# Patient Record
Sex: FEMALE | Race: WHITE | NOT HISPANIC OR LATINO
[De-identification: names, ages, dates, MRNs, and addresses within clinical notes are randomized per-mention and may not be internally consistent; named-entity substitution may affect disease eponyms.]

---

## 2020-10-27 DIAGNOSIS — Z86.79 PERSONAL HISTORY OF OTHER DISEASES OF THE CIRCULATORY SYSTEM: ICD-10-CM

## 2020-10-27 DIAGNOSIS — Z86.39 PERSONAL HISTORY OF OTHER ENDOCRINE, NUTRITIONAL AND METABOLIC DISEASE: ICD-10-CM

## 2020-10-27 DIAGNOSIS — Z87.19 PERSONAL HISTORY OF OTHER DISEASES OF THE DIGESTIVE SYSTEM: ICD-10-CM

## 2020-10-27 DIAGNOSIS — Z87.09 PERSONAL HISTORY OF OTHER DISEASES OF THE RESPIRATORY SYSTEM: ICD-10-CM

## 2020-10-27 DIAGNOSIS — Z87.39 PERSONAL HISTORY OF OTHER DISEASES OF THE MUSCULOSKELETAL SYSTEM AND CONNECTIVE TISSUE: ICD-10-CM

## 2020-10-29 ENCOUNTER — APPOINTMENT (OUTPATIENT)
Dept: SURGERY | Facility: CLINIC | Age: 65
End: 2020-10-29
Payer: MEDICARE

## 2020-10-29 VITALS
DIASTOLIC BLOOD PRESSURE: 69 MMHG | SYSTOLIC BLOOD PRESSURE: 117 MMHG | HEIGHT: 61 IN | WEIGHT: 132 LBS | HEART RATE: 68 BPM | BODY MASS INDEX: 24.92 KG/M2 | OXYGEN SATURATION: 98 %

## 2020-10-29 DIAGNOSIS — Z86.39 PERSONAL HISTORY OF OTHER ENDOCRINE, NUTRITIONAL AND METABOLIC DISEASE: ICD-10-CM

## 2020-10-29 PROCEDURE — 99072 ADDL SUPL MATRL&STAF TM PHE: CPT

## 2020-10-29 PROCEDURE — 99204 OFFICE O/P NEW MOD 45 MIN: CPT

## 2020-10-29 NOTE — REVIEW OF SYSTEMS
[Hearing Loss] : hearing loss [As Noted in HPI] : as noted in HPI [Negative] : Heme/Lymph [de-identified] : dysphagia and sore throat

## 2020-10-29 NOTE — REASON FOR VISIT
[Initial Consultation] : an initial consultation for [FreeTextEntry2] : hyperparathyroidism in the setting of bilateral thyroid nodules and new onset of mild renal insufficiency

## 2020-10-29 NOTE — PHYSICAL EXAM
[Midline] : located in midline position [Normal] : orientation to person, place, and time: normal [de-identified] : Extremities: JUSTICE x 4.   Skin: No obvious skin lesions.   Voice: clear

## 2020-11-18 ENCOUNTER — NON-APPOINTMENT (OUTPATIENT)
Age: 65
End: 2020-11-18

## 2020-11-18 LAB
CAU: 8 MG/DL
CREAT 24H UR-MCNC: 0.7 G/24 H
CREAT ?TM UR-MCNC: 58 MG/DL
PROT ?TM UR-MCNC: 24 HR
SPECIMEN VOL 24H UR: 1200 ML
SPECIMEN VOL 24H UR: 96 MG/24 H

## 2020-11-24 ENCOUNTER — OUTPATIENT (OUTPATIENT)
Dept: OUTPATIENT SERVICES | Facility: HOSPITAL | Age: 65
LOS: 1 days | End: 2020-11-24

## 2020-11-25 ENCOUNTER — APPOINTMENT (OUTPATIENT)
Dept: NUCLEAR MEDICINE | Facility: CLINIC | Age: 65
End: 2020-11-25
Payer: MEDICARE

## 2020-11-25 DIAGNOSIS — E21.3 HYPERPARATHYROIDISM, UNSPECIFIED: ICD-10-CM

## 2020-11-25 PROCEDURE — 78072 PARATHYRD PLANAR W/SPECT&CT: CPT | Mod: 26

## 2020-11-30 ENCOUNTER — APPOINTMENT (OUTPATIENT)
Dept: SURGERY | Facility: CLINIC | Age: 65
End: 2020-11-30
Payer: MEDICARE

## 2020-11-30 PROCEDURE — 99442: CPT

## 2021-01-21 ENCOUNTER — TRANSCRIPTION ENCOUNTER (OUTPATIENT)
Age: 66
End: 2021-01-21

## 2021-03-20 ENCOUNTER — TRANSCRIPTION ENCOUNTER (OUTPATIENT)
Age: 66
End: 2021-03-20

## 2021-04-01 ENCOUNTER — OUTPATIENT (OUTPATIENT)
Dept: OUTPATIENT SERVICES | Facility: HOSPITAL | Age: 66
LOS: 1 days | End: 2021-04-01
Payer: MEDICARE

## 2021-04-01 VITALS
HEIGHT: 61.5 IN | SYSTOLIC BLOOD PRESSURE: 110 MMHG | HEART RATE: 78 BPM | TEMPERATURE: 98 F | WEIGHT: 139.99 LBS | RESPIRATION RATE: 18 BRPM | OXYGEN SATURATION: 99 % | DIASTOLIC BLOOD PRESSURE: 60 MMHG

## 2021-04-01 DIAGNOSIS — Z01.812 ENCOUNTER FOR PREPROCEDURAL LABORATORY EXAMINATION: ICD-10-CM

## 2021-04-01 DIAGNOSIS — Z87.2 PERSONAL HISTORY OF DISEASES OF THE SKIN AND SUBCUTANEOUS TISSUE: Chronic | ICD-10-CM

## 2021-04-01 DIAGNOSIS — E03.9 HYPOTHYROIDISM, UNSPECIFIED: ICD-10-CM

## 2021-04-01 DIAGNOSIS — J45.909 UNSPECIFIED ASTHMA, UNCOMPLICATED: ICD-10-CM

## 2021-04-01 DIAGNOSIS — E04.2 NONTOXIC MULTINODULAR GOITER: ICD-10-CM

## 2021-04-01 DIAGNOSIS — N81.10 CYSTOCELE, UNSPECIFIED: Chronic | ICD-10-CM

## 2021-04-01 DIAGNOSIS — Z91.013 ALLERGY TO SEAFOOD: ICD-10-CM

## 2021-04-01 DIAGNOSIS — Z95.1 PRESENCE OF AORTOCORONARY BYPASS GRAFT: Chronic | ICD-10-CM

## 2021-04-01 DIAGNOSIS — I25.10 ATHEROSCLEROTIC HEART DISEASE OF NATIVE CORONARY ARTERY WITHOUT ANGINA PECTORIS: ICD-10-CM

## 2021-04-01 DIAGNOSIS — Z90.49 ACQUIRED ABSENCE OF OTHER SPECIFIED PARTS OF DIGESTIVE TRACT: Chronic | ICD-10-CM

## 2021-04-01 LAB
ANION GAP SERPL CALC-SCNC: 7 MMOL/L — SIGNIFICANT CHANGE UP (ref 7–14)
BUN SERPL-MCNC: 15 MG/DL — SIGNIFICANT CHANGE UP (ref 7–23)
CALCIUM SERPL-MCNC: 9.9 MG/DL — SIGNIFICANT CHANGE UP (ref 8.4–10.5)
CHLORIDE SERPL-SCNC: 105 MMOL/L — SIGNIFICANT CHANGE UP (ref 98–107)
CO2 SERPL-SCNC: 28 MMOL/L — SIGNIFICANT CHANGE UP (ref 22–31)
CREAT SERPL-MCNC: 0.91 MG/DL — SIGNIFICANT CHANGE UP (ref 0.5–1.3)
GLUCOSE SERPL-MCNC: 84 MG/DL — SIGNIFICANT CHANGE UP (ref 70–99)
HCT VFR BLD CALC: 44 % — SIGNIFICANT CHANGE UP (ref 34.5–45)
HGB BLD-MCNC: 14.1 G/DL — SIGNIFICANT CHANGE UP (ref 11.5–15.5)
MCHC RBC-ENTMCNC: 29.2 PG — SIGNIFICANT CHANGE UP (ref 27–34)
MCHC RBC-ENTMCNC: 32 GM/DL — SIGNIFICANT CHANGE UP (ref 32–36)
MCV RBC AUTO: 91.1 FL — SIGNIFICANT CHANGE UP (ref 80–100)
NRBC # BLD: 0 /100 WBCS — SIGNIFICANT CHANGE UP
NRBC # FLD: 0 K/UL — SIGNIFICANT CHANGE UP
PLATELET # BLD AUTO: 285 K/UL — SIGNIFICANT CHANGE UP (ref 150–400)
POTASSIUM SERPL-MCNC: 4.5 MMOL/L — SIGNIFICANT CHANGE UP (ref 3.5–5.3)
POTASSIUM SERPL-SCNC: 4.5 MMOL/L — SIGNIFICANT CHANGE UP (ref 3.5–5.3)
RBC # BLD: 4.83 M/UL — SIGNIFICANT CHANGE UP (ref 3.8–5.2)
RBC # FLD: 12.3 % — SIGNIFICANT CHANGE UP (ref 10.3–14.5)
SODIUM SERPL-SCNC: 140 MMOL/L — SIGNIFICANT CHANGE UP (ref 135–145)
WBC # BLD: 6.59 K/UL — SIGNIFICANT CHANGE UP (ref 3.8–10.5)
WBC # FLD AUTO: 6.59 K/UL — SIGNIFICANT CHANGE UP (ref 3.8–10.5)

## 2021-04-01 PROCEDURE — 93010 ELECTROCARDIOGRAM REPORT: CPT

## 2021-04-01 NOTE — H&P PST ADULT - HISTORY OF PRESENT ILLNESS
66y/o female presents for  64y/o female presents for preop eval for scheduled parathyroidectomy with PTH assay.  Pt with c/o fatigue, and elevated  serum calcium.

## 2021-04-01 NOTE — H&P PST ADULT - NSICDXPASTMEDICALHX_GEN_ALL_CORE_FT
PAST MEDICAL HISTORY:  Acid reflux     Angina pectoris     Asthma     CAD (coronary artery disease)     Dyslipidemia     Hypothyroidism     IBS (irritable bowel syndrome)     Nontoxic multinodular goiter      PAST MEDICAL HISTORY:  Acid reflux     Angina pectoris     Asthma     CAD (coronary artery disease)     Dyslipidemia     [place] (hard of hearing)     Hypothyroidism     IBS (irritable bowel syndrome)     Meniere disease     Nontoxic multinodular goiter

## 2021-04-01 NOTE — H&P PST ADULT - NSANTHOSAYNRD_GEN_A_CORE
No. NE screening performed.  STOP BANG Legend: 0-2 = LOW Risk; 3-4 = INTERMEDIATE Risk; 5-8 = HIGH Risk

## 2021-04-01 NOTE — H&P PST ADULT - NSICDXPASTSURGICALHX_GEN_ALL_CORE_FT
PAST SURGICAL HISTORY:  Female bladder prolapse bladder lift  1999    H/O cyst of breast X2 1980    History of cholecystectomy 1999    S/P CABG x 2 2009

## 2021-04-01 NOTE — H&P PST ADULT - NEGATIVE CARDIOVASCULAR SYMPTOMS
no chest pain/no palpitations/no dyspnea on exertion/no orthopnea/no paroxysmal nocturnal dyspnea/no peripheral edema/no claudication no palpitations/no dyspnea on exertion/no orthopnea/no paroxysmal nocturnal dyspnea/no peripheral edema/no claudication

## 2021-04-01 NOTE — H&P PST ADULT - NSICDXFAMILYHX_GEN_ALL_CORE_FT
FAMILY HISTORY:  Father  Still living? No  FHx: heart disease, Age at diagnosis: Age Unknown  FHx: hypertension, Age at diagnosis: Age Unknown    Mother  Still living? No  FHx: atrial fibrillation, Age at diagnosis: Age Unknown  FHx: hypertension, Age at diagnosis: Age Unknown

## 2021-04-01 NOTE — H&P PST ADULT - NSICDXPROBLEM_GEN_ALL_CORE_FT
PROBLEM DIAGNOSES  Problem: Nontoxic multinodular goiter  Assessment and Plan: Scheduled for parathyroidectomy with parathyroid hormone assay on 4/7/2021  Written & verbal preop instructions, gi prophylaxis - pt to take own & surgical soap given  Pt verbalized good understanding.  Teach back done on surgical soap instructions.      Problem: Encounter for preprocedure screening laboratory testing for COVID-19  Assessment and Plan: per pt scheduled within 72 hrs of this surgery     Problem: CAD (coronary artery disease)  Assessment and Plan: Pt instructed to take  on morning of surgery.      Problem: Hypothyroidism  Assessment and Plan: Pt instructed to take levothyroxine on morning of surgery.         PROBLEM DIAGNOSES  Problem: Nontoxic multinodular goiter  Assessment and Plan: Scheduled for parathyroidectomy with parathyroid hormone assay on 4/7/2021  Written & verbal preop instructions, gi prophylaxis - pt to take own & surgical soap given  Pt verbalized good understanding.  Teach back done on surgical soap instructions.      Problem: Encounter for preprocedure screening laboratory testing for COVID-19  Assessment and Plan: per pt scheduled within 72 hrs of this surgery     Problem: CAD (coronary artery disease)  Assessment and Plan: Pt instructed to take Ranolazine on morning of surgery.  Pt on aspirin 325mg, last dose 4/1/2021 per Dr Ruff (cardiologist)  Pending copy of recent stress test & echo and cardiology eval  medical eval requested by surgeon  Pending copy of report      Problem: Hypothyroidism  Assessment and Plan: Pt instructed to take levothyroxine on morning of surgery.      Problem: Asthma  Assessment and Plan: controlled on montelukast, continue per routine schedule    Problem: Shellfish allergy  Assessment and Plan: & Penicillin allergy  OR booking notified        PROBLEM DIAGNOSES  Problem: Nontoxic multinodular goiter  Assessment and Plan: Scheduled for parathyroidectomy with parathyroid hormone assay on 4/7/2021  Written & verbal preop instructions, gi prophylaxis - pt to take own & surgical soap given  Pt verbalized good understanding.  Teach back done on surgical soap instructions.      Problem: Encounter for preprocedure screening laboratory testing for COVID-19  Assessment and Plan: per pt scheduled within 72 hrs of this surgery     Problem: CAD (coronary artery disease)  Assessment and Plan: Pt instructed to take Ranolazine on morning of surgery.  Pt on aspirin 325mg, last dose 4/1/2021 per Dr Ruff (cardiologist)  Pending copy of recent stress test & echo and cardiology eval  medical eval requested by surgeon  Pending copy of report      Problem: Hypothyroidism  Assessment and Plan: Pt instructed to take levothyroxine on morning of surgery.      Problem: Asthma  Assessment and Plan: controlled on montelukast, continue per routine schedule    Problem: Shellfish allergy  Assessment and Plan: Penicillin allergy & Latex allergy  OR booking notified

## 2021-04-04 ENCOUNTER — APPOINTMENT (OUTPATIENT)
Dept: DISASTER EMERGENCY | Facility: CLINIC | Age: 66
End: 2021-04-04

## 2021-04-04 LAB — SARS-COV-2 N GENE NPH QL NAA+PROBE: NOT DETECTED

## 2021-04-06 ENCOUNTER — TRANSCRIPTION ENCOUNTER (OUTPATIENT)
Age: 66
End: 2021-04-06

## 2021-04-06 NOTE — ASU PATIENT PROFILE, ADULT - PMH
Acid reflux    Angina pectoris    Asthma    CAD (coronary artery disease)    Dyslipidemia    [place] (hard of hearing)    Hypothyroidism    IBS (irritable bowel syndrome)    Meniere disease    Nontoxic multinodular goiter

## 2021-04-06 NOTE — ASU PATIENT PROFILE, ADULT - PSH
Female bladder prolapse  bladder lift  1999  H/O cyst of breast  X2 1980  History of cholecystectomy  1999  S/P CABG x 2  2009

## 2021-04-07 ENCOUNTER — RESULT REVIEW (OUTPATIENT)
Age: 66
End: 2021-04-07

## 2021-04-07 ENCOUNTER — OUTPATIENT (OUTPATIENT)
Dept: OUTPATIENT SERVICES | Facility: HOSPITAL | Age: 66
LOS: 1 days | Discharge: HOME | End: 2021-04-07
Payer: MEDICARE

## 2021-04-07 ENCOUNTER — APPOINTMENT (OUTPATIENT)
Dept: SURGERY | Facility: HOSPITAL | Age: 66
End: 2021-04-07

## 2021-04-07 VITALS
SYSTOLIC BLOOD PRESSURE: 100 MMHG | OXYGEN SATURATION: 99 % | RESPIRATION RATE: 12 BRPM | HEART RATE: 68 BPM | DIASTOLIC BLOOD PRESSURE: 55 MMHG

## 2021-04-07 VITALS
RESPIRATION RATE: 15 BRPM | DIASTOLIC BLOOD PRESSURE: 54 MMHG | SYSTOLIC BLOOD PRESSURE: 105 MMHG | HEIGHT: 61.5 IN | HEART RATE: 61 BPM | WEIGHT: 139.99 LBS | TEMPERATURE: 98 F | OXYGEN SATURATION: 98 %

## 2021-04-07 DIAGNOSIS — Z95.1 PRESENCE OF AORTOCORONARY BYPASS GRAFT: Chronic | ICD-10-CM

## 2021-04-07 DIAGNOSIS — Z86.39 PERSONAL HISTORY OF OTHER ENDOCRINE, NUTRITIONAL AND METABOLIC DISEASE: ICD-10-CM

## 2021-04-07 DIAGNOSIS — E04.2 NONTOXIC MULTINODULAR GOITER: ICD-10-CM

## 2021-04-07 DIAGNOSIS — Z90.49 ACQUIRED ABSENCE OF OTHER SPECIFIED PARTS OF DIGESTIVE TRACT: Chronic | ICD-10-CM

## 2021-04-07 DIAGNOSIS — Z87.2 PERSONAL HISTORY OF DISEASES OF THE SKIN AND SUBCUTANEOUS TISSUE: Chronic | ICD-10-CM

## 2021-04-07 DIAGNOSIS — N81.10 CYSTOCELE, UNSPECIFIED: Chronic | ICD-10-CM

## 2021-04-07 PROBLEM — H81.09 MENIERE'S DISEASE, UNSPECIFIED EAR: Chronic | Status: ACTIVE | Noted: 2021-04-01

## 2021-04-07 PROBLEM — E78.5 HYPERLIPIDEMIA, UNSPECIFIED: Chronic | Status: ACTIVE | Noted: 2021-04-01

## 2021-04-07 PROBLEM — K58.9 IRRITABLE BOWEL SYNDROME WITHOUT DIARRHEA: Chronic | Status: ACTIVE | Noted: 2021-04-01

## 2021-04-07 PROBLEM — E03.9 HYPOTHYROIDISM, UNSPECIFIED: Chronic | Status: ACTIVE | Noted: 2021-04-01

## 2021-04-07 PROBLEM — I20.9 ANGINA PECTORIS, UNSPECIFIED: Chronic | Status: ACTIVE | Noted: 2021-04-01

## 2021-04-07 PROBLEM — J45.909 UNSPECIFIED ASTHMA, UNCOMPLICATED: Chronic | Status: ACTIVE | Noted: 2021-04-01

## 2021-04-07 PROBLEM — H91.90 UNSPECIFIED HEARING LOSS, UNSPECIFIED EAR: Chronic | Status: ACTIVE | Noted: 2021-04-01

## 2021-04-07 PROBLEM — K21.9 GASTRO-ESOPHAGEAL REFLUX DISEASE WITHOUT ESOPHAGITIS: Chronic | Status: ACTIVE | Noted: 2021-04-01

## 2021-04-07 PROBLEM — I25.10 ATHEROSCLEROTIC HEART DISEASE OF NATIVE CORONARY ARTERY WITHOUT ANGINA PECTORIS: Chronic | Status: ACTIVE | Noted: 2021-04-01

## 2021-04-07 PROCEDURE — 60500 EXPLORE PARATHYROID GLANDS: CPT

## 2021-04-07 PROCEDURE — 88331 PATH CONSLTJ SURG 1 BLK 1SPC: CPT | Mod: 26

## 2021-04-07 PROCEDURE — 88305 TISSUE EXAM BY PATHOLOGIST: CPT | Mod: 26

## 2021-04-07 RX ORDER — LEVOTHYROXINE SODIUM 125 MCG
1 TABLET ORAL
Qty: 0 | Refills: 0 | DISCHARGE

## 2021-04-07 RX ORDER — ATORVASTATIN CALCIUM 80 MG/1
1 TABLET, FILM COATED ORAL
Qty: 0 | Refills: 0 | DISCHARGE

## 2021-04-07 RX ORDER — PANTOPRAZOLE SODIUM 20 MG/1
1 TABLET, DELAYED RELEASE ORAL
Qty: 0 | Refills: 0 | DISCHARGE

## 2021-04-07 RX ORDER — FUROSEMIDE 40 MG
1 TABLET ORAL
Qty: 0 | Refills: 0 | DISCHARGE

## 2021-04-07 RX ORDER — MONTELUKAST 4 MG/1
1 TABLET, CHEWABLE ORAL
Qty: 0 | Refills: 0 | DISCHARGE

## 2021-04-07 RX ORDER — ASPIRIN/CALCIUM CARB/MAGNESIUM 324 MG
1 TABLET ORAL
Qty: 0 | Refills: 0 | DISCHARGE

## 2021-04-07 RX ORDER — RANOLAZINE 500 MG/1
1 TABLET, FILM COATED, EXTENDED RELEASE ORAL
Qty: 0 | Refills: 0 | DISCHARGE

## 2021-04-07 RX ORDER — EZETIMIBE 10 MG/1
1 TABLET ORAL
Qty: 0 | Refills: 0 | DISCHARGE

## 2021-04-07 RX ORDER — POTASSIUM CHLORIDE 20 MEQ
1 PACKET (EA) ORAL
Qty: 0 | Refills: 0 | DISCHARGE

## 2021-04-07 NOTE — ASU DISCHARGE PLAN (ADULT/PEDIATRIC) - NURSING INSTRUCTIONS
You were given intravenous TYLENOL for pain management at 10:30 AM. Please DO NOT take any products containing TYLENOL or ACETAMINOPHEN, such as VICODIN, PERCOCET, EXCEDRIN, and any over-the-counter cold medication for the next 6 hours until 4:30 PM. DO NOT TAKE MORE THAN 3000 MG OF TYLENOL in a 24 hour period.

## 2021-04-07 NOTE — BRIEF OPERATIVE NOTE - NSICDXBRIEFPROCEDURE_GEN_ALL_CORE_FT
PROCEDURES:  Subtotal parathyroidectomy 07-Apr-2021 11:23:32 3 gland parathyroidectomy Ana Mireles

## 2021-04-07 NOTE — ASU DISCHARGE PLAN (ADULT/PEDIATRIC) - ASU DC SPECIAL INSTRUCTIONSFT
You may resume home aspirin 24 hours after surgery    PAIN: You continue to take Percecot as prescribed for pain    WOUND CARE:  You should allow warm soapy water to run down the wound in the shower. You do not need to scrub the area. You do not have any stitches that need to be removed.    BATHING: You may shower/sponge bathe 48 hours after your surgery date. Please do not soak or submerge the wound in water (bath, swimming) for 14 days after your surgery.    ACTIVITY: No heavy lifting, straining, or vigorous activity until your follow-up appointment in 2 weeks.     DIET: You may resume your regular diet.     FOLLOW-UP: Please call the office and make an appointment to follow up with Dr. Choudhury in 2 weeks. You may resume home aspirin 24 hours after surgery    PAIN: You continue to take percocet as prescribed for pain    WOUND CARE:  You should allow warm soapy water to run down the wound in the shower. You do not need to scrub the area. You do not have any stitches that need to be removed.    BATHING: You may shower/sponge bathe 48 hours after your surgery date. Please do not soak or submerge the wound in water (bath, swimming) for 14 days after your surgery.    ACTIVITY: No heavy lifting, straining, or vigorous activity until your follow-up appointment in 2 weeks.     DIET: You may resume your regular diet.     FOLLOW-UP: Please call the office and make an appointment to follow up with Dr. Choudhury in 2 weeks.

## 2021-04-07 NOTE — BRIEF OPERATIVE NOTE - OPERATION/FINDINGS
Preop    post right superior parathyroid excision; hypercellular on frozen section  PTH 61, 54 at 10 and 23 min post left inferior parathyroid excision; normal on frozen section  PTH 27, 18, 19 at 10, 20, and 45 min post right inferior parathyroid excision; normal on frozen section    Left superior parathyroid gland marked with clips

## 2021-04-07 NOTE — ASU DISCHARGE PLAN (ADULT/PEDIATRIC) - CARE PROVIDER_API CALL
Ricardo Choudhury)  Surgery  [address] [place] [address], [address]  [place] [address]  Phone: [phone]  Fax: [phone]  Follow Up Time: 2 weeks

## 2021-04-08 RX ORDER — ASPIRIN/CALCIUM CARB/MAGNESIUM 324 MG
1 TABLET ORAL
Qty: 0 | Refills: 0 | DISCHARGE
Start: 2021-04-08

## 2021-04-12 ENCOUNTER — NON-APPOINTMENT (OUTPATIENT)
Age: 66
End: 2021-04-12

## 2021-04-15 ENCOUNTER — APPOINTMENT (OUTPATIENT)
Dept: SURGERY | Facility: CLINIC | Age: 66
End: 2021-04-15
Payer: MEDICARE

## 2021-04-15 VITALS
WEIGHT: 132 LBS | SYSTOLIC BLOOD PRESSURE: 113 MMHG | BODY MASS INDEX: 24.92 KG/M2 | HEART RATE: 80 BPM | DIASTOLIC BLOOD PRESSURE: 74 MMHG | HEIGHT: 61 IN | OXYGEN SATURATION: 97 %

## 2021-04-15 DIAGNOSIS — Z01.818 ENCOUNTER FOR OTHER PREPROCEDURAL EXAMINATION: ICD-10-CM

## 2021-04-15 PROCEDURE — 99024 POSTOP FOLLOW-UP VISIT: CPT

## 2021-04-15 NOTE — PHYSICAL EXAM
[Midline] : located in midline position [Normal] : orientation to person, place, and time: normal [de-identified] : Extremities: JUSTICE x 4.   Skin: No obvious skin lesions.   Voice: clear

## 2021-05-20 ENCOUNTER — LABORATORY RESULT (OUTPATIENT)
Age: 66
End: 2021-05-20

## 2021-05-27 ENCOUNTER — APPOINTMENT (OUTPATIENT)
Dept: SURGERY | Facility: CLINIC | Age: 66
End: 2021-05-27
Payer: MEDICARE

## 2021-05-27 ENCOUNTER — TRANSCRIPTION ENCOUNTER (OUTPATIENT)
Age: 66
End: 2021-05-27

## 2021-05-27 VITALS
OXYGEN SATURATION: 99 % | DIASTOLIC BLOOD PRESSURE: 71 MMHG | WEIGHT: 132 LBS | BODY MASS INDEX: 24.92 KG/M2 | SYSTOLIC BLOOD PRESSURE: 107 MMHG | HEART RATE: 88 BPM | HEIGHT: 61 IN

## 2021-05-27 PROCEDURE — 99024 POSTOP FOLLOW-UP VISIT: CPT

## 2021-05-27 NOTE — PHYSICAL EXAM
[Midline] : located in midline position [Normal] : orientation to person, place, and time: normal [de-identified] : Extremities: JUSTICE x 4.   Skin: No obvious skin lesions.   Voice: clear

## 2021-08-04 ENCOUNTER — APPOINTMENT (OUTPATIENT)
Dept: OTOLARYNGOLOGY | Facility: CLINIC | Age: 66
End: 2021-08-04
Payer: MEDICARE

## 2021-08-04 VITALS
SYSTOLIC BLOOD PRESSURE: 101 MMHG | BODY MASS INDEX: 24.73 KG/M2 | WEIGHT: 131 LBS | HEIGHT: 61 IN | HEART RATE: 57 BPM | DIASTOLIC BLOOD PRESSURE: 60 MMHG

## 2021-08-04 DIAGNOSIS — J31.0 CHRONIC RHINITIS: ICD-10-CM

## 2021-08-04 PROCEDURE — 92557 COMPREHENSIVE HEARING TEST: CPT

## 2021-08-04 PROCEDURE — 92567 TYMPANOMETRY: CPT

## 2021-08-04 PROCEDURE — 31231 NASAL ENDOSCOPY DX: CPT | Mod: 52

## 2021-08-04 PROCEDURE — G0268 REMOVAL OF IMPACTED WAX MD: CPT

## 2021-08-04 PROCEDURE — 99203 OFFICE O/P NEW LOW 30 MIN: CPT | Mod: 25

## 2021-08-04 NOTE — PROCEDURE
[FreeTextEntry3] : Procedure note:  Bilateral cerumenectomy\par \par Description of Procedure:  Cerumen impaction was noted requiring debridement under the operating microscope using otologic instrumentation.  This procedure was repeated in the contralateral ear.  The patient tolerated the procedure without complications. [FreeTextEntry6] : Procedure note: Nasal endoscopy\par \par Description of Procedure:  Nasal endoscopy was performed because of recalcitrant symptoms of nasal obstruction and/or chronic rhinitis, and anterior anatomic abnormalities precluding visualization.  Using a flexible endoscope with sheath, the nasal mucosa, septum, turbinates, and ostiomeatal complex were examined.  \par \par Nasal mucosa findings:  the nasal mucosa was mildly edematous.\par Septum findings:  the septum showed no abnormalities.\par Nasopharynx findings:  the patient could not tolerate examination of the nasopharynx.\par Middle meatus findings:  the middle meatus had no abnormalities.\par Sinuses findings:  the paranasal sinuses had no abnormalities.\par

## 2021-08-04 NOTE — DATA REVIEWED
[de-identified] : AD: Type Ad, AS: Type A\par moderate to moderate-severe SNHL, 250-8000 Hz, AU with mixed component 1K and 3K Hz AD\par REC: ENT f/u, f/u with [facility] for hearing aid check/reprogramming AU hearing aids\par

## 2021-08-04 NOTE — REVIEW OF SYSTEMS
[Seasonal Allergies] : seasonal allergies [Post Nasal Drip] : post nasal drip [Hearing Loss] : hearing loss [Dizziness] : dizziness [Vertigo] : vertigo [Lightheadedness] : lightheadedness [Ear Noises] : ear noises [Nasal Congestion] : nasal congestion [Problem Snoring] : problem snoring [Throat Clearing] : throat clearing [Throat Dryness] : throat dryness [Throat Itching] : throat itching [Chest Pain] : chest pain [Shortness Of Breath] : shortness of breath [Wheezing] : wheezing [Joint Pain] : joint pain [Negative] : Heme/Lymph

## 2021-08-13 ENCOUNTER — NON-APPOINTMENT (OUTPATIENT)
Age: 66
End: 2021-08-13

## 2021-08-20 ENCOUNTER — APPOINTMENT (OUTPATIENT)
Dept: PHARMACY | Facility: CLINIC | Age: 66
End: 2021-08-20

## 2021-08-25 ENCOUNTER — TRANSCRIPTION ENCOUNTER (OUTPATIENT)
Age: 66
End: 2021-08-25

## 2021-09-27 ENCOUNTER — APPOINTMENT (OUTPATIENT)
Dept: OTOLARYNGOLOGY | Facility: CLINIC | Age: 66
End: 2021-09-27
Payer: MEDICARE

## 2021-09-27 PROCEDURE — 92540 BASIC VESTIBULAR EVALUATION: CPT

## 2021-09-27 PROCEDURE — 92537 CALORIC VSTBLR TEST W/REC: CPT

## 2021-09-29 ENCOUNTER — TRANSCRIPTION ENCOUNTER (OUTPATIENT)
Age: 66
End: 2021-09-29

## 2021-10-07 ENCOUNTER — LABORATORY RESULT (OUTPATIENT)
Age: 66
End: 2021-10-07

## 2021-10-14 ENCOUNTER — APPOINTMENT (OUTPATIENT)
Dept: SURGERY | Facility: CLINIC | Age: 66
End: 2021-10-14
Payer: MEDICARE

## 2021-10-14 VITALS
HEIGHT: 61 IN | WEIGHT: 131 LBS | DIASTOLIC BLOOD PRESSURE: 67 MMHG | HEART RATE: 80 BPM | SYSTOLIC BLOOD PRESSURE: 119 MMHG | OXYGEN SATURATION: 99 % | BODY MASS INDEX: 24.73 KG/M2

## 2021-10-14 DIAGNOSIS — Z09 ENCOUNTER FOR FOLLOW-UP EXAMINATION AFTER COMPLETED TREATMENT FOR CONDITIONS OTHER THAN MALIGNANT NEOPLASM: ICD-10-CM

## 2021-10-14 PROCEDURE — 99213 OFFICE O/P EST LOW 20 MIN: CPT

## 2021-10-14 NOTE — PHYSICAL EXAM
[Midline] : located in midline position [Normal] : orientation to person, place, and time: normal [de-identified] : Extremities: JUSTICE x 4.   Skin: No obvious skin lesions.   Voice: clear

## 2021-10-25 ENCOUNTER — APPOINTMENT (OUTPATIENT)
Dept: OTOLARYNGOLOGY | Facility: CLINIC | Age: 66
End: 2021-10-25

## 2021-12-20 ENCOUNTER — TRANSCRIPTION ENCOUNTER (OUTPATIENT)
Age: 66
End: 2021-12-20

## 2022-01-11 ENCOUNTER — APPOINTMENT (OUTPATIENT)
Dept: ENDOCRINOLOGY | Facility: CLINIC | Age: 67
End: 2022-01-11

## 2022-01-11 DIAGNOSIS — Z86.39 PERSONAL HISTORY OF OTHER ENDOCRINE, NUTRITIONAL AND METABOLIC DISEASE: ICD-10-CM

## 2022-03-16 ENCOUNTER — NON-APPOINTMENT (OUTPATIENT)
Age: 67
End: 2022-03-16

## 2022-04-22 ENCOUNTER — LABORATORY RESULT (OUTPATIENT)
Age: 67
End: 2022-04-22

## 2022-04-22 ENCOUNTER — NON-APPOINTMENT (OUTPATIENT)
Age: 67
End: 2022-04-22

## 2022-05-01 PROBLEM — Z86.39 HISTORY OF SECONDARY HYPERPARATHYROIDISM: Status: RESOLVED | Noted: 2021-10-08 | Resolved: 2022-05-01

## 2022-05-19 ENCOUNTER — APPOINTMENT (OUTPATIENT)
Dept: SURGERY | Facility: CLINIC | Age: 67
End: 2022-05-19
Payer: MEDICARE

## 2022-05-19 DIAGNOSIS — E04.2 NONTOXIC MULTINODULAR GOITER: ICD-10-CM

## 2022-05-19 DIAGNOSIS — M81.0 AGE-RELATED OSTEOPOROSIS W/OUT CURRENT PATHOLOGICAL FRACTURE: ICD-10-CM

## 2022-05-19 DIAGNOSIS — N28.9 DISORDER OF KIDNEY AND URETER, UNSPECIFIED: ICD-10-CM

## 2022-05-19 PROCEDURE — 99212 OFFICE O/P EST SF 10 MIN: CPT

## 2022-05-19 NOTE — PHYSICAL EXAM
[Midline] : located in midline position [Normal] : orientation to person, place, and time: normal [de-identified] : Extremities: JUSTICE x 4.   Skin: No obvious skin lesions.   Voice: clear

## 2022-07-20 ENCOUNTER — NON-APPOINTMENT (OUTPATIENT)
Age: 67
End: 2022-07-20

## 2022-09-15 ENCOUNTER — APPOINTMENT (OUTPATIENT)
Dept: OTOLARYNGOLOGY | Facility: CLINIC | Age: 67
End: 2022-09-15

## 2022-09-15 VITALS
SYSTOLIC BLOOD PRESSURE: 124 MMHG | DIASTOLIC BLOOD PRESSURE: 76 MMHG | WEIGHT: 131 LBS | BODY MASS INDEX: 24.73 KG/M2 | HEART RATE: 52 BPM | HEIGHT: 61 IN

## 2022-09-15 DIAGNOSIS — H93.293 OTHER ABNORMAL AUDITORY PERCEPTIONS, BILATERAL: ICD-10-CM

## 2022-09-15 DIAGNOSIS — R42 DIZZINESS AND GIDDINESS: ICD-10-CM

## 2022-09-15 DIAGNOSIS — H90.5 UNSPECIFIED SENSORINEURAL HEARING LOSS: ICD-10-CM

## 2022-09-15 PROCEDURE — 69210 REMOVE IMPACTED EAR WAX UNI: CPT

## 2022-09-15 PROCEDURE — 99213 OFFICE O/P EST LOW 20 MIN: CPT | Mod: 25

## 2022-09-15 NOTE — ASSESSMENT
[FreeTextEntry1] : Reports persistent bilateral hearing loss, worse in the right ear, with subjective decline since last visit, good benefit with hearing aids.  Also reports stable disequilibrium, some improvement with vestibular therapy.  Otoscopic exam today shows intact bilateral tympanic membranes without effusion or retraction.  I personally reviewed and interpreted prior audiogram results, which shows stable air conduction thresholds bilaterally relative to audiogram from 2021, with bilateral mixed loss and small air-bone gap.  Recommended continue bilateral hearing aid usage, follow-up annually to monitor hearing and perform cleaning.  Avoid Q-tip usage. DISPLAY PLAN FREE TEXT

## 2022-09-15 NOTE — ASSESSMENT
[FreeTextEntry1] : Reports persistent bilateral hearing loss, worse in the right ear, with subjective decline since last visit, good benefit with hearing aids.  Also reports stable disequilibrium, some improvement with vestibular therapy.  Otoscopic exam today shows intact bilateral tympanic membranes without effusion or retraction.  I personally reviewed and interpreted prior audiogram results, which shows stable air conduction thresholds bilaterally relative to audiogram from 2021, with bilateral mixed loss and small air-bone gap.  Recommended continue bilateral hearing aid usage, follow-up annually to monitor hearing and perform cleaning.  Avoid Q-tip usage.

## 2023-05-19 ENCOUNTER — OFFICE (OUTPATIENT)
Dept: URBAN - METROPOLITAN AREA CLINIC 104 | Facility: CLINIC | Age: 68
Setting detail: OPHTHALMOLOGY
End: 2023-05-19
Payer: COMMERCIAL

## 2023-05-19 ENCOUNTER — RX ONLY (RX ONLY)
Age: 68
End: 2023-05-19

## 2023-05-19 DIAGNOSIS — H25.13: ICD-10-CM

## 2023-05-19 PROBLEM — H16.223 DRY EYE SYNDROME K SICCA; BOTH EYES: Status: ACTIVE | Noted: 2023-05-19

## 2023-05-19 PROBLEM — H50.331: Status: ACTIVE | Noted: 2023-05-19

## 2023-05-19 PROCEDURE — 92004 COMPRE OPH EXAM NEW PT 1/>: CPT | Performed by: OPHTHALMOLOGY

## 2023-05-19 ASSESSMENT — KERATOMETRY
OS_AXISANGLE_DEGREES: 026
OS_K2POWER_DIOPTERS: 43.10
OD_K1POWER_DIOPTERS: 42.88
OS_K1POWER_DIOPTERS: 42.99
OD_K2POWER_DIOPTERS: 43.27
OD_AXISANGLE_DEGREES: 071

## 2023-05-19 ASSESSMENT — REFRACTION_CURRENTRX
OS_AXIS: 001
OS_OVR_VA: 20/
OS_CYLINDER: -0.25
OD_SPHERE: -0.75
OS_SPHERE: -0.25
OD_OVR_VA: 20/

## 2023-05-19 ASSESSMENT — REFRACTION_AUTOREFRACTION
OD_CYLINDER: -0.25
OD_SPHERE: -0.75
OD_AXIS: 095
OS_SPHERE: PLANO
OS_CYLINDER: SPHERE

## 2023-05-19 ASSESSMENT — VISUAL ACUITY
OD_BCVA: 20/25
OS_BCVA: 20/25

## 2023-05-19 ASSESSMENT — TONOMETRY
OS_IOP_MMHG: 15
OD_IOP_MMHG: 15

## 2023-05-19 ASSESSMENT — AXIALLENGTH_DERIVED: OD_AL: 24.1

## 2023-05-19 ASSESSMENT — CONFRONTATIONAL VISUAL FIELD TEST (CVF)
OD_FINDINGS: FULL
OS_FINDINGS: FULL

## 2023-05-19 ASSESSMENT — SPHEQUIV_DERIVED: OD_SPHEQUIV: -0.875

## 2023-05-19 ASSESSMENT — SUPERFICIAL PUNCTATE KERATITIS (SPK)
OS_SPK: 1+
OD_SPK: 1+

## 2023-08-10 ENCOUNTER — APPOINTMENT (OUTPATIENT)
Dept: OBGYN | Facility: CLINIC | Age: 68
End: 2023-08-10
Payer: MEDICARE

## 2023-08-10 VITALS
SYSTOLIC BLOOD PRESSURE: 125 MMHG | BODY MASS INDEX: 27.38 KG/M2 | HEART RATE: 79 BPM | HEIGHT: 61 IN | DIASTOLIC BLOOD PRESSURE: 69 MMHG | WEIGHT: 145 LBS

## 2023-08-10 DIAGNOSIS — Z00.00 ENCOUNTER FOR GENERAL ADULT MEDICAL EXAMINATION W/OUT ABNORMAL FINDINGS: ICD-10-CM

## 2023-08-10 DIAGNOSIS — H61.23 IMPACTED CERUMEN, BILATERAL: ICD-10-CM

## 2023-08-10 DIAGNOSIS — Z11.51 ENCOUNTER FOR SCREENING FOR HUMAN PAPILLOMAVIRUS (HPV): ICD-10-CM

## 2023-08-10 DIAGNOSIS — Z01.419 ENCOUNTER FOR GYNECOLOGICAL EXAMINATION (GENERAL) (ROUTINE) W/OUT ABNORMAL FINDINGS: ICD-10-CM

## 2023-08-10 DIAGNOSIS — Z12.31 ENCOUNTER FOR SCREENING MAMMOGRAM FOR MALIGNANT NEOPLASM OF BREAST: ICD-10-CM

## 2023-08-10 DIAGNOSIS — Z12.4 ENCOUNTER FOR SCREENING FOR MALIGNANT NEOPLASM OF CERVIX: ICD-10-CM

## 2023-08-10 DIAGNOSIS — R92.2 INCONCLUSIVE MAMMOGRAM: ICD-10-CM

## 2023-08-10 PROCEDURE — G0101: CPT

## 2023-08-11 NOTE — HISTORY OF PRESENT ILLNESS
[FreeTextEntry1] : Patient is 68-year-old  0 last menstrual period age 49 Patient presents for annual visit,, denies any complaints

## 2023-08-11 NOTE — PLAN
[FreeTextEntry1] : Patient is a 68-year-old  0 last menstrual period approximately age 49 Patient presents for annual visit,, denies any complaints Physical exam reveals a well-developed well-nourished female no apparent distress,, BMI 27 Heart regular rhythm and rate, lungs clear, breast no mass nontender no skin change or nipple discharge no adenopathy, abdomen soft nontender no organomegaly. No exam shows normal female external genitalia atrophic, vagina no lesions atrophic, cervix appropriate size nontender, uterus anteverted normal size nontender, adnexa no mass nontender. Pap smear was performed Patient will be sent for a breast sonogram as a follow-up since patient had previous studies done on November 15, 2022 which showed small nodularities on both sides and had advised 6-month follow-up which at this point patient is overdue for Patient will have her routine annual mammogram past  of this year Essentially benign joint exam Questions answered Patient that she understands  Renetta was present as a chaperone for the entire assessment and examination of this patient

## 2023-08-29 ENCOUNTER — NON-APPOINTMENT (OUTPATIENT)
Age: 68
End: 2023-08-29

## 2023-12-08 ENCOUNTER — NON-APPOINTMENT (OUTPATIENT)
Age: 68
End: 2023-12-08

## 2024-06-24 ENCOUNTER — NON-APPOINTMENT (OUTPATIENT)
Age: 69
End: 2024-06-24

## 2024-08-08 ENCOUNTER — APPOINTMENT (OUTPATIENT)
Dept: SURGERY | Facility: CLINIC | Age: 69
End: 2024-08-08

## 2024-08-08 PROCEDURE — 99213 OFFICE O/P EST LOW 20 MIN: CPT

## 2024-08-08 NOTE — PHYSICAL EXAM
[Midline] : located in midline position [Normal] : orientation to person, place, and time: normal [de-identified] : The neck appears flat.  There is a flat and faded transverse cervical scar in the mid anteior neck. [de-identified] : Extremities: JUSTICE x 4.   Skin: No obvious skin lesions.   Voice: clear

## 2024-08-08 NOTE — ASSESSMENT
[FreeTextEntry1] : Assessment:  69-year-old woman, with history significant for hypothyroidism on Synthroid and a right superior, right inferior, and left inferior parathyroidectomy, with bilateral thyroid nodules.  Plan: - The recent follow-up neck ultrasound was reviewed with Ms. Brasher and I explained that when compared to her prior ultrasounds, a left mid subcentimeter nodule was reported to be new while the remainder of her nodules have remained stable. - The indications for biopsy and thyroidectomy were then reviewed and I explained the only nodule that currently meets ESTEBAN criteria for a biopsy has been biopsied, was felt to be benign, and has remained stable for over the past 15 years.  I have therefore recommended continued observation.  Considering that she was noted to have a new left thyroid nodule, will plan for a follow-u ultrasound next year (July, 2025). - The patient expressed understanding of the above and agrees with this plan.  She was instructed to follow-up next year.  Will arrange for her to have the follow-up ultrasound prior to her appointment such that we can review the results and further management at that time.

## 2024-08-08 NOTE — HISTORY OF PRESENT ILLNESS
[de-identified] : Ms. Brasher presents for follow-up.  Her recent follow-up neck ultrasound, performed 07/11/2024 ([facility]), reported a right mid 1.4 cm hypoechoic solid thyroid nodule, a left mid 0.4 cm hypoechoic solid thyroid nodule, a left mid 0.4 cm hypoechoic solid thyroid nodule, and a subcentimeter left thyroid cyst.  There was no lymphadenopathy appreciated.  When compared to her prior ultrasounds, one of the left mid subcentimeter nodules was reported to be new while the remainder of her nodules have remained stable.

## 2024-08-11 ENCOUNTER — NON-APPOINTMENT (OUTPATIENT)
Age: 69
End: 2024-08-11

## 2024-08-16 ENCOUNTER — OFFICE (OUTPATIENT)
Dept: URBAN - METROPOLITAN AREA CLINIC 104 | Facility: CLINIC | Age: 69
Setting detail: OPHTHALMOLOGY
End: 2024-08-16
Payer: COMMERCIAL

## 2024-08-16 ENCOUNTER — RX ONLY (RX ONLY)
Age: 69
End: 2024-08-16

## 2024-08-16 DIAGNOSIS — H25.13: ICD-10-CM

## 2024-08-16 DIAGNOSIS — H43.811: ICD-10-CM

## 2024-08-16 DIAGNOSIS — H16.223: ICD-10-CM

## 2024-08-16 DIAGNOSIS — H50.331: ICD-10-CM

## 2024-08-16 PROCEDURE — 92014 COMPRE OPH EXAM EST PT 1/>: CPT | Performed by: OPHTHALMOLOGY

## 2024-08-16 ASSESSMENT — CONFRONTATIONAL VISUAL FIELD TEST (CVF)
OS_FINDINGS: FULL
OD_FINDINGS: FULL

## 2024-10-04 ENCOUNTER — LABORATORY RESULT (OUTPATIENT)
Age: 69
End: 2024-10-04

## 2024-10-04 ENCOUNTER — APPOINTMENT (OUTPATIENT)
Dept: INFECTIOUS DISEASE | Facility: CLINIC | Age: 69
End: 2024-10-04
Payer: MEDICARE

## 2024-10-04 VITALS — SYSTOLIC BLOOD PRESSURE: 120 MMHG | DIASTOLIC BLOOD PRESSURE: 68 MMHG

## 2024-10-04 DIAGNOSIS — Z01.84 ENCOUNTER FOR ANTIBODY RESPONSE EXAMINATION: ICD-10-CM

## 2024-10-04 PROCEDURE — 99203 OFFICE O/P NEW LOW 30 MIN: CPT

## 2024-10-04 NOTE — HISTORY OF PRESENT ILLNESS
[FreeTextEntry1] : 69 F presents to the ID clinic for consultation.   Recent LUE rash, she is concerned for Lyme disease  She has a Lyme test which shows IgG bands, consistent with past disease not active disease  Currently no complaints - no rash, no fevers

## 2024-10-04 NOTE — ASSESSMENT
[FreeTextEntry1] : 69 F presents to the ID clinic for consultation.   Recent LUE rash, she is concerned for Lyme disease. She has a Lyme test which shows IgG bands, consistent with past disease not active disease.   She states she is allergic to PCN (rash) Has had diarrhea 2/2 Doxycycline in past  Plan: Check Lyme VISE test - if she truly had Lyme i/s/o recent rash, IgM should be + Will treat based on test results - since she is hesitant to retry Doxycycline, Cefuroxime Axetil 500mg PO BID x 14 days remains an option.

## 2024-10-04 NOTE — PHYSICAL EXAM
[General Appearance - Alert] : alert [General Appearance - In No Acute Distress] : in no acute distress [Extraocular Movements] : extraocular movements were intact [Outer Ear] : the ears and nose were normal in appearance [Neck Appearance] : the appearance of the neck was normal [Heart Sounds] : normal S1 and S2 [Edema] : there was no peripheral edema [Abdomen Soft] : soft [Musculoskeletal - Swelling] : no joint swelling [] : no rash [No Focal Deficits] : no focal deficits [Oriented To Time, Place, And Person] : oriented to person, place, and time

## 2025-01-14 ENCOUNTER — NON-APPOINTMENT (OUTPATIENT)
Age: 70
End: 2025-01-14

## 2025-04-29 ENCOUNTER — APPOINTMENT (OUTPATIENT)
Dept: ENDOCRINOLOGY | Facility: CLINIC | Age: 70
End: 2025-04-29
Payer: MEDICARE

## 2025-04-29 VITALS
OXYGEN SATURATION: 98 % | HEIGHT: 61 IN | WEIGHT: 146 LBS | HEART RATE: 60 BPM | SYSTOLIC BLOOD PRESSURE: 122 MMHG | DIASTOLIC BLOOD PRESSURE: 60 MMHG | BODY MASS INDEX: 27.56 KG/M2

## 2025-04-29 DIAGNOSIS — E03.9 HYPOTHYROIDISM, UNSPECIFIED: ICD-10-CM

## 2025-04-29 DIAGNOSIS — M81.0 AGE-RELATED OSTEOPOROSIS W/OUT CURRENT PATHOLOGICAL FRACTURE: ICD-10-CM

## 2025-04-29 DIAGNOSIS — E04.2 NONTOXIC MULTINODULAR GOITER: ICD-10-CM

## 2025-04-29 PROCEDURE — 99204 OFFICE O/P NEW MOD 45 MIN: CPT

## 2025-04-29 RX ORDER — MAGNESIUM OXIDE/MAG AA CHELATE 300 MG
CAPSULE ORAL
Refills: 0 | Status: ACTIVE | COMMUNITY

## 2025-04-29 RX ORDER — MELOXICAM 15 MG/1
TABLET ORAL
Refills: 0 | Status: ACTIVE | COMMUNITY

## 2025-04-29 RX ORDER — POTASSIUM CHLORIDE 1500 MG/1
TABLET, EXTENDED RELEASE ORAL
Refills: 0 | Status: ACTIVE | COMMUNITY

## 2025-04-29 RX ORDER — ASPIRIN 325 MG/1
325 TABLET ORAL
Refills: 0 | Status: ACTIVE | COMMUNITY

## 2025-04-29 RX ORDER — RANOLAZINE 1000 MG/1
1000 TABLET, FILM COATED, EXTENDED RELEASE ORAL
Refills: 0 | Status: ACTIVE | COMMUNITY

## 2025-04-29 RX ORDER — MULTIVIT WITH MIN/ALA/HERBS 50 MG
TABLET ORAL
Refills: 0 | Status: ACTIVE | COMMUNITY

## 2025-04-29 RX ORDER — OMEPRAZOLE 20 MG/1
20 TABLET, DELAYED RELEASE ORAL
Refills: 0 | Status: ACTIVE | COMMUNITY

## 2025-04-29 RX ORDER — ATORVASTATIN CALCIUM 80 MG/1
TABLET, FILM COATED ORAL
Refills: 0 | Status: ACTIVE | COMMUNITY

## 2025-04-29 RX ORDER — LEVOTHYROXINE SODIUM 0.03 MG/1
25 TABLET ORAL
Refills: 0 | Status: ACTIVE | COMMUNITY

## 2025-04-29 RX ORDER — UBIDECARENONE/VIT E ACET 100MG-5
CAPSULE ORAL
Refills: 0 | Status: ACTIVE | COMMUNITY

## 2025-04-29 NOTE — REASON FOR VISIT
[Initial Evaluation] : an initial evaluation [Thyroid nodule/ MNG] : thyroid nodule/ MNG [Hyperparathyroidism] : hyperparathyroidism [Hypothyroidism] : hypothyroidism

## 2025-04-29 NOTE — ASSESSMENT
[FreeTextEntry1] : 70 year old female with a past medical history of hyperparathyroidism status post right superior, right inferior, left inferior parathyroidectomy, multiple thyroid nodules, hypothyroidism presents to establish care  1. Hyperparathyroidism s/p parathyroidectomy Will check levels  2. Hypothyroidism  Hashimoto's Thyroiditis  Will repeat labs  Blood was drawn in the office today Cont Levothyroxine 25 mcg mon-fri, 50 mcg sat-sun  3. Multiple Thyroid Nodules Explained thyroid nodules and the risk of cancer, FNA and management Following with Dr Mae US due in august  4. Osteoporosis Patient warrants treatment for her OP given that she is at high risk for fractures. Diet, weight bearing and muscle strengthening exercise and fall prevention were discussed. Smoking cessation and alcohol consumption (no more then an average 2 drinks/day) was discussed. I counseled the patient regarding calcium and vitamin D intake. Calcium 1200 mg daily from diet and supplements (to be taken in divided doses as no more than 500-600 mg can be absorbed at one time) Rec to cont Prolia with PCP

## 2025-04-29 NOTE — HISTORY OF PRESENT ILLNESS
[FreeTextEntry1] : Ms. GABI BIGGS is a 70 year old female with a past medical history of hyperparathyroidism status post right superior, right inferior, left inferior parathyroidectomy, multiple thyroid nodules, hypothyroidism, CAD s/p CABG, Congenital heart disease, Osteoporosis, Meniere presents to [facility] care.   Re Thyroid Patient was first diagnosed with thyroid disorder many years ago. She takes levothyroxine 25 mcg mon-fri, 50 mcg sat-sun. Patient takes it in an empty stomach 30-60 mins before breakfast in the morning. Patient also has thyroid nodules. She has family history of thyroid disease.     Bone History: Menopause: Last menstrual period 50s Osteoporosis diagnosed in 2019 Fracture history: heel fracture, knee cap fracture Family history: No parental history of osteoporosis Treatment: Prolia ( 5 years  Falls: No Height loss: No Kidney stones: No Dental health: Regular appointments, no history of implants, no upcoming procedures planned Exercise:  Dairy intake: yes Calcium supplements: yes Multivitamin: None Vitamin D supplements: Vitamin D3   Osteoporosis risk factors include: Postmenopausal status,  race, prior fracture, falls, height loss, small thin bones, tobacco use, excessive alcohol, anorexia, family history, vitamin D deficiency, corticosteroid use, seizure medications, malabsorption, hyperparathyroidism, hyperthyroidism. NEGATIVE EXCEPT:  race, small thin bones, post menopause, hypoerparathyroidism

## 2025-05-07 ENCOUNTER — NON-APPOINTMENT (OUTPATIENT)
Age: 70
End: 2025-05-07

## 2025-05-09 LAB
24R-OH-CALCIDIOL SERPL-MCNC: 25.5 PG/ML
25(OH)D3 SERPL-MCNC: 48.2 NG/ML
ALBUMIN SERPL ELPH-MCNC: 4.3 G/DL
ALP BLD-CCNC: 93 U/L
ALT SERPL-CCNC: 38 U/L
ANION GAP SERPL CALC-SCNC: 17 MMOL/L
AST SERPL-CCNC: 24 U/L
BILIRUB SERPL-MCNC: 0.2 MG/DL
BUN SERPL-MCNC: 17 MG/DL
CALCIUM SERPL-MCNC: 9.6 MG/DL
CALCIUM SERPL-MCNC: 9.6 MG/DL
CHLORIDE SERPL-SCNC: 102 MMOL/L
CO2 SERPL-SCNC: 22 MMOL/L
CREAT SERPL-MCNC: 1 MG/DL
EGFRCR SERPLBLD CKD-EPI 2021: 60 ML/MIN/1.73M2
GLUCOSE SERPL-MCNC: 89 MG/DL
PARATHYROID HORMONE INTACT: 20 PG/ML
PHOSPHATE SERPL-MCNC: 3.2 MG/DL
POTASSIUM SERPL-SCNC: 4 MMOL/L
PROT SERPL-MCNC: 6.8 G/DL
SODIUM SERPL-SCNC: 141 MMOL/L
T4 FREE SERPL-MCNC: 1.2 NG/DL
TSH SERPL-ACNC: 1.16 UIU/ML

## 2025-05-12 NOTE — H&P PST ADULT - DOES THIS PATIENT HAVE A HISTORY OF OR HAS BEEN DX WITH HEART FAILURE?
Quality 130: Documentation Of Current Medications In The Medical Record: Current Medications Documented Quality 431: Preventive Care And Screening: Unhealthy Alcohol Use - Screening: Patient not identified as an unhealthy alcohol user when screened for unhealthy alcohol use using a systematic screening method Quality 226: Preventive Care And Screening: Tobacco Use: Screening And Cessation Intervention: Patient screened for tobacco use and is an ex/non-smoker Detail Level: Detailed no

## 2025-08-14 ENCOUNTER — APPOINTMENT (OUTPATIENT)
Dept: SURGERY | Facility: CLINIC | Age: 70
End: 2025-08-14
Payer: MEDICARE

## 2025-08-14 VITALS
HEIGHT: 61 IN | WEIGHT: 138 LBS | OXYGEN SATURATION: 97 % | DIASTOLIC BLOOD PRESSURE: 67 MMHG | HEART RATE: 59 BPM | BODY MASS INDEX: 26.06 KG/M2 | SYSTOLIC BLOOD PRESSURE: 121 MMHG

## 2025-08-14 DIAGNOSIS — E03.9 HYPOTHYROIDISM, UNSPECIFIED: ICD-10-CM

## 2025-08-14 DIAGNOSIS — E04.2 NONTOXIC MULTINODULAR GOITER: ICD-10-CM

## 2025-08-14 DIAGNOSIS — Z98.890 OTHER SPECIFIED POSTPROCEDURAL STATES: ICD-10-CM

## 2025-08-14 DIAGNOSIS — Z86.39 PERSONAL HISTORY OF OTHER ENDOCRINE, NUTRITIONAL AND METABOLIC DISEASE: ICD-10-CM

## 2025-08-14 PROCEDURE — 99213 OFFICE O/P EST LOW 20 MIN: CPT
